# Patient Record
Sex: MALE | Race: WHITE | NOT HISPANIC OR LATINO
[De-identification: names, ages, dates, MRNs, and addresses within clinical notes are randomized per-mention and may not be internally consistent; named-entity substitution may affect disease eponyms.]

---

## 2019-03-26 PROBLEM — Z00.00 ENCOUNTER FOR PREVENTIVE HEALTH EXAMINATION: Status: ACTIVE | Noted: 2019-03-26

## 2019-03-28 ENCOUNTER — APPOINTMENT (OUTPATIENT)
Dept: NEPHROLOGY | Facility: CLINIC | Age: 19
End: 2019-03-28
Payer: COMMERCIAL

## 2019-03-28 ENCOUNTER — LABORATORY RESULT (OUTPATIENT)
Age: 19
End: 2019-03-28

## 2019-03-28 VITALS
DIASTOLIC BLOOD PRESSURE: 120 MMHG | BODY MASS INDEX: 22.18 KG/M2 | SYSTOLIC BLOOD PRESSURE: 220 MMHG | HEART RATE: 90 BPM | HEIGHT: 66 IN | WEIGHT: 138 LBS

## 2019-03-28 DIAGNOSIS — Z87.39 PERSONAL HISTORY OF OTHER DISEASES OF THE MUSCULOSKELETAL SYSTEM AND CONNECTIVE TISSUE: ICD-10-CM

## 2019-03-28 DIAGNOSIS — Z87.09 PERSONAL HISTORY OF OTHER DISEASES OF THE RESPIRATORY SYSTEM: ICD-10-CM

## 2019-03-28 DIAGNOSIS — Z02.82 ENCOUNTER FOR ADOPTION SERVICES: ICD-10-CM

## 2019-03-28 DIAGNOSIS — R31.0 GROSS HEMATURIA: ICD-10-CM

## 2019-03-28 PROCEDURE — 36415 COLL VENOUS BLD VENIPUNCTURE: CPT

## 2019-03-28 PROCEDURE — 99244 OFF/OP CNSLTJ NEW/EST MOD 40: CPT | Mod: 25

## 2019-03-28 NOTE — PHYSICAL EXAM
[General Appearance - Alert] : alert [General Appearance - In No Acute Distress] : in no acute distress [General Appearance - Well Nourished] : well nourished [Sclera] : the sclera and conjunctiva were normal [PERRL With Normal Accommodation] : pupils were equal in size, round, and reactive to light [Extraocular Movements] : extraocular movements were intact [Outer Ear] : the ears and nose were normal in appearance [Oropharynx] : the oropharynx was normal [Neck Appearance] : the appearance of the neck was normal [Neck Cervical Mass (___cm)] : no neck mass was observed [] : no respiratory distress [Auscultation Breath Sounds / Voice Sounds] : lungs were clear to auscultation bilaterally [Heart Rate And Rhythm] : heart rate was normal and rhythm regular [Heart Sounds] : normal S1 and S2 [Murmurs] : no murmurs [Heart Sounds Pericardial Friction Rub] : no pericardial rub [Arterial Pulses Carotid] : carotid pulses were normal with no bruits [Edema] : there was no peripheral edema [Bowel Sounds] : normal bowel sounds [Abdomen Soft] : soft [Abdomen Tenderness] : non-tender [No CVA Tenderness] : no ~M costovertebral angle tenderness [No Focal Deficits] : no focal deficits [Oriented To Time, Place, And Person] : oriented to person, place, and time [Affect] : the affect was normal [Mood] : the mood was normal [FreeTextEntry1] : striae on folds of upper arms and back

## 2019-03-28 NOTE — CONSULT LETTER
[Dear  ___] : Dear  [unfilled], [Consult Letter:] : I had the pleasure of evaluating your patient, [unfilled]. [( Thank you for referring [unfilled] for consultation for _____ )] : Thank you for referring [unfilled] for consultation for [unfilled] [Please see my note below.] : Please see my note below. [Consult Closing:] : Thank you very much for allowing me to participate in the care of this patient.  If you have any questions, please do not hesitate to contact me. [Sincerely,] : Sincerely, [FreeTextEntry3] : Gabrielle Wilkerson MD\par  [facility]\par Division of Nephrology and Hypertension

## 2019-03-28 NOTE — HISTORY OF PRESENT ILLNESS
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  He was seen by his PMD Dr. Morse earlier in the week.  His blood pressure was 160/120s\par He also had a bout of gross hematuria on Monday and he saw clot, which eventually resolved.  Denies any flank pain, denies any dysuria or fevers.\par Patient has a history of elevated blood pressure since 2017.  According to patient and family members does not appear to have followed up with his poorly controlled hypertension.  Blood pressure was in the 180-200s.  Reportedly had renal sonogram done as well as echocardiogram in 2017 results of which are not available to me at the present.\par Does not take any stimulants.\par He used to take Advil every couple weeks for neck pain or headaches.  \par Denies any soda or ceffeine.  Drinks 5-6 cups of water.  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

## 2019-03-28 NOTE — ASSESSMENT
[FreeTextEntry1] : 19-year-old male with history of malignant hypertension and gross hematuria one episode.\par Malignant hypertension reportedly since 2017.  Currently not on any medication.  An unclear workup.\par We will have him perform comprehensive blood work today including serum metanephrine, aldosterone, renin cortisol. TSH and free T4\par Performed 24-hour fractionated catecholamines after abstaining from chocolate for a few days.\par Perform renal Doppler.  \par Low-sodium diet.\par Start labetalol 100 mg 2 times a day with eventually escalation of dose.\par Gross hematuria: Follow-up renal function today.  Check UA/microalbumin to creatinine ratio.  We will check complements as well as ANCA and double-stranded DNA\par Avoid NSAIDs.\par All questions were answered.  \par Follow-up next week for BP check.  \par He was advised to monitor home readings

## 2019-03-28 NOTE — REASON FOR VISIT
[Consultation] : a consultation visit [Parent] : parent [FreeTextEntry1] : Malignant hypertension and hematuria with proteinuria

## 2019-04-01 LAB
ALBUMIN SERPL ELPH-MCNC: 4.9 G/DL
ALDOSTERONE SERUM: 10.6 NG/DL
ALP BLD-CCNC: 98 U/L
ALT SERPL-CCNC: 45 U/L
ANA SER IF-ACNC: NEGATIVE
ANION GAP SERPL CALC-SCNC: 20 MMOL/L
APPEARANCE: CLEAR
AST SERPL-CCNC: 24 U/L
BACTERIA: NEGATIVE
BASOPHILS # BLD AUTO: 0.04 K/UL
BASOPHILS NFR BLD AUTO: 0.6 %
BILIRUB SERPL-MCNC: 0.4 MG/DL
BILIRUBIN URINE: NEGATIVE
BLOOD URINE: NEGATIVE
BUN SERPL-MCNC: 16 MG/DL
C3 SERPL-MCNC: 159 MG/DL
C4 SERPL-MCNC: 20 MG/DL
CALCIUM SERPL-MCNC: 10 MG/DL
CHLORIDE SERPL-SCNC: 101 MMOL/L
CHOLEST SERPL-MCNC: 225 MG/DL
CHOLEST/HDLC SERPL: 5.6 RATIO
CO2 SERPL-SCNC: 22 MMOL/L
COLOR: NORMAL
CORTIS SERPL-MCNC: 10 UG/DL
CREAT SERPL-MCNC: 1.07 MG/DL
CREAT SPEC-SCNC: 138 MG/DL
EOSINOPHIL # BLD AUTO: 0.08 K/UL
EOSINOPHIL NFR BLD AUTO: 1.2 %
ESTIMATED AVERAGE GLUCOSE: 100 MG/DL
GLUCOSE QUALITATIVE U: NEGATIVE
GLUCOSE SERPL-MCNC: 82 MG/DL
HBA1C MFR BLD HPLC: 5.1 %
HCT VFR BLD CALC: 48.2 %
HDLC SERPL-MCNC: 40 MG/DL
HGB BLD-MCNC: 16.3 G/DL
HYALINE CASTS: 0 /LPF
IMM GRANULOCYTES NFR BLD AUTO: 0.1 %
KETONES URINE: NEGATIVE
LDLC SERPL CALC-MCNC: 113 MG/DL
LEUKOCYTE ESTERASE URINE: NEGATIVE
LYMPHOCYTES # BLD AUTO: 1.42 K/UL
LYMPHOCYTES NFR BLD AUTO: 20.6 %
MAGNESIUM SERPL-MCNC: 2.1 MG/DL
MAN DIFF?: NORMAL
MCHC RBC-ENTMCNC: 30 PG
MCHC RBC-ENTMCNC: 33.8 GM/DL
MCV RBC AUTO: 88.8 FL
MICROALBUMIN 24H UR DL<=1MG/L-MCNC: 43.3 MG/DL
MICROALBUMIN/CREAT 24H UR-RTO: 313 MG/G
MICROSCOPIC-UA: NORMAL
MONOCYTES # BLD AUTO: 0.55 K/UL
MONOCYTES NFR BLD AUTO: 8 %
MPO AB + PR3 PNL SER: NORMAL
NEUTROPHILS # BLD AUTO: 4.79 K/UL
NEUTROPHILS NFR BLD AUTO: 69.5 %
NITRITE URINE: NEGATIVE
PH URINE: 7
PLATELET # BLD AUTO: 265 K/UL
POTASSIUM SERPL-SCNC: 4.7 MMOL/L
PROT SERPL-MCNC: 8.5 G/DL
PROTEIN URINE: ABNORMAL
RBC # BLD: 5.43 M/UL
RBC # FLD: 13.9 %
RED BLOOD CELLS URINE: 3 /HPF
SODIUM SERPL-SCNC: 142 MMOL/L
SPECIFIC GRAVITY URINE: 1.02
SQUAMOUS EPITHELIAL CELLS: 1 /HPF
TRIGL SERPL-MCNC: 362 MG/DL
TSH SERPL-ACNC: 1.32 UIU/ML
UROBILINOGEN URINE: NORMAL
WBC # FLD AUTO: 6.89 K/UL
WHITE BLOOD CELLS URINE: 1 /HPF

## 2019-04-03 ENCOUNTER — APPOINTMENT (OUTPATIENT)
Dept: NEPHROLOGY | Facility: CLINIC | Age: 19
End: 2019-04-03
Payer: COMMERCIAL

## 2019-04-03 VITALS
HEART RATE: 83 BPM | HEIGHT: 66 IN | SYSTOLIC BLOOD PRESSURE: 174 MMHG | WEIGHT: 145.5 LBS | DIASTOLIC BLOOD PRESSURE: 102 MMHG | BODY MASS INDEX: 23.38 KG/M2 | OXYGEN SATURATION: 98 %

## 2019-04-03 VITALS — DIASTOLIC BLOOD PRESSURE: 98 MMHG | HEART RATE: 80 BPM | SYSTOLIC BLOOD PRESSURE: 168 MMHG

## 2019-04-03 LAB
DSDNA AB SER-ACNC: <12 IU/ML
METANEPHRINE, PL: <10 PG/ML
NORMETANEPHRINE, PL: 12 PG/ML
RENIN ACTIVITY, PLASMA: 5.21 NG/ML/HR

## 2019-04-03 PROCEDURE — 99214 OFFICE O/P EST MOD 30 MIN: CPT

## 2019-04-04 NOTE — HISTORY OF PRESENT ILLNESS
[FreeTextEntry1] : Tolerating labetalol 100AM and 200 PM last couple days\par BP at home have been 160-170/90s\par Overall improvement

## 2019-04-04 NOTE — PHYSICAL EXAM
[General Appearance - Alert] : alert [General Appearance - In No Acute Distress] : in no acute distress [Sclera] : the sclera and conjunctiva were normal [Outer Ear] : the ears and nose were normal in appearance [Neck Appearance] : the appearance of the neck was normal [Auscultation Breath Sounds / Voice Sounds] : lungs were clear to auscultation bilaterally [Heart Rate And Rhythm] : heart rate was normal and rhythm regular [Heart Sounds] : normal S1 and S2 [Heart Sounds Gallop] : no gallops [Murmurs] : no murmurs [Heart Sounds Pericardial Friction Rub] : no pericardial rub [Edema] : there was no peripheral edema [Bowel Sounds] : normal bowel sounds [Abdomen Soft] : soft [No CVA Tenderness] : no ~M costovertebral angle tenderness [] : no rash [No Focal Deficits] : no focal deficits [Oriented To Time, Place, And Person] : oriented to person, place, and time [Affect] : the affect was normal

## 2019-04-04 NOTE — ASSESSMENT
[FreeTextEntry1] : 18 yo male with likely essential HTN\par Much improved BPs \par He was adopted and born in [place]\par His serologies are negative and kaylah, renin, metanephrine neg\par 24 hour catecholamine pending, renal doppler to be done\par Increase labetalol 200mg BID for this week then next week increase to 300mg BID\par Followup in 2 weeks\par

## 2019-04-11 LAB
DOPAMINE UR-MCNC: 204 UG/L
DOPAMINE, UR, 24HR: 163 UG/24 HR
EPINEPH UR-MCNC: 8 UG/L
EPINEPHRINE, U, 24HR: 6 UG/24 HR
NOREPINEPH UR-MCNC: 18 UG/L
NOREPINEPHRINE,U,24H: 14 UG/24 HR

## 2019-04-16 ENCOUNTER — APPOINTMENT (OUTPATIENT)
Dept: NEPHROLOGY | Facility: CLINIC | Age: 19
End: 2019-04-16
Payer: COMMERCIAL

## 2019-04-16 VITALS
SYSTOLIC BLOOD PRESSURE: 152 MMHG | WEIGHT: 149.91 LBS | HEIGHT: 66 IN | DIASTOLIC BLOOD PRESSURE: 103 MMHG | OXYGEN SATURATION: 98 % | BODY MASS INDEX: 24.09 KG/M2 | HEART RATE: 73 BPM

## 2019-04-16 PROCEDURE — 99214 OFFICE O/P EST MOD 30 MIN: CPT

## 2019-04-17 NOTE — ASSESSMENT
[FreeTextEntry1] : 18 yo male with likely essential HTN\par Much improved BPs \par He was adopted and born in [place]\par His serologies are negative and kaylah, renin, metanephrine neg\par 24 hour catecholamine neg, renal doppler to be done\par Increase labetalol 300mg TID\par followup in 2 months

## 2019-04-17 NOTE — HISTORY OF PRESENT ILLNESS
[FreeTextEntry1] : Tolerating labetalol 300 mg BID\par BP was 130-140/80-90s\par Doing well\par Denies sensing any physical change

## 2019-04-17 NOTE — PHYSICAL EXAM
[General Appearance - Alert] : alert [General Appearance - In No Acute Distress] : in no acute distress [Outer Ear] : the ears and nose were normal in appearance [Neck Appearance] : the appearance of the neck was normal [Sclera] : the sclera and conjunctiva were normal [Auscultation Breath Sounds / Voice Sounds] : lungs were clear to auscultation bilaterally [Heart Rate And Rhythm] : heart rate was normal and rhythm regular [Heart Sounds] : normal S1 and S2 [Heart Sounds Gallop] : no gallops [Murmurs] : no murmurs [Heart Sounds Pericardial Friction Rub] : no pericardial rub [Edema] : there was no peripheral edema [Abdomen Soft] : soft [No CVA Tenderness] : no ~M costovertebral angle tenderness [Bowel Sounds] : normal bowel sounds [] : no rash [No Focal Deficits] : no focal deficits [Affect] : the affect was normal [Oriented To Time, Place, And Person] : oriented to person, place, and time

## 2019-06-18 ENCOUNTER — APPOINTMENT (OUTPATIENT)
Dept: NEPHROLOGY | Facility: CLINIC | Age: 19
End: 2019-06-18
Payer: COMMERCIAL

## 2019-06-18 VITALS
DIASTOLIC BLOOD PRESSURE: 91 MMHG | OXYGEN SATURATION: 98 % | HEIGHT: 66 IN | WEIGHT: 144.4 LBS | HEART RATE: 75 BPM | BODY MASS INDEX: 23.21 KG/M2 | SYSTOLIC BLOOD PRESSURE: 132 MMHG

## 2019-06-18 PROCEDURE — 99213 OFFICE O/P EST LOW 20 MIN: CPT

## 2019-06-18 NOTE — ASSESSMENT
[FreeTextEntry1] : 20 yo male with likely essential HTN\par Much improved BPs \par His serologies are negative and kaylah, renin, metanephrine neg\par 24 hour catecholamine neg, renal doppler neg\par Continue labetalol 300mg TID. Na restriction\par followup in 3 months and daphne:cr ratio to be done at that time

## 2019-06-18 NOTE — HISTORY OF PRESENT ILLNESS
[FreeTextEntry1] : Tolerating labetalol 300 mg TID\par BP at home has been 130/90s\par Doing well\par Finished his finals

## 2019-08-13 ENCOUNTER — APPOINTMENT (OUTPATIENT)
Dept: NEPHROLOGY | Facility: CLINIC | Age: 19
End: 2019-08-13
Payer: COMMERCIAL

## 2019-08-13 VITALS
BODY MASS INDEX: 22.5 KG/M2 | HEIGHT: 66 IN | WEIGHT: 140 LBS | OXYGEN SATURATION: 99 % | SYSTOLIC BLOOD PRESSURE: 139 MMHG | DIASTOLIC BLOOD PRESSURE: 87 MMHG | HEART RATE: 72 BPM

## 2019-08-13 VITALS — SYSTOLIC BLOOD PRESSURE: 136 MMHG | DIASTOLIC BLOOD PRESSURE: 86 MMHG | HEART RATE: 70 BPM

## 2019-08-13 PROCEDURE — 99213 OFFICE O/P EST LOW 20 MIN: CPT

## 2019-08-13 NOTE — HISTORY OF PRESENT ILLNESS
[FreeTextEntry1] : Tolerating labetalol 300 mg TID\par BP at home has been 120-130/80-90s\par Doing well\par Has been resting over the summer and due back at [employer] later this month.

## 2019-08-13 NOTE — ASSESSMENT
[FreeTextEntry1] : 20 yo male with likely essential HTN\par Much improved BPs \par His serologies are negative and kaylah, renin, metanephrine neg\par 24 hour catecholamine neg, renal doppler neg\par Continue labetalol 300mg TID. \par Na restriction\par Proteinuria: Recheck microalbumin to creatinine ratio.\par Followup in 6 month

## 2019-08-14 LAB
CREAT SPEC-SCNC: 244 MG/DL
MICROALBUMIN 24H UR DL<=1MG/L-MCNC: 11.3 MG/DL
MICROALBUMIN/CREAT 24H UR-RTO: 46 MG/G

## 2020-02-18 ENCOUNTER — APPOINTMENT (OUTPATIENT)
Dept: NEPHROLOGY | Facility: CLINIC | Age: 20
End: 2020-02-18
Payer: COMMERCIAL

## 2020-02-18 ENCOUNTER — APPOINTMENT (OUTPATIENT)
Dept: NEPHROLOGY | Facility: CLINIC | Age: 20
End: 2020-02-18

## 2020-02-18 VITALS
HEIGHT: 66 IN | WEIGHT: 150 LBS | SYSTOLIC BLOOD PRESSURE: 130 MMHG | DIASTOLIC BLOOD PRESSURE: 68 MMHG | HEART RATE: 70 BPM | BODY MASS INDEX: 24.11 KG/M2

## 2020-02-18 PROCEDURE — 99213 OFFICE O/P EST LOW 20 MIN: CPT

## 2020-02-18 NOTE — ASSESSMENT
[FreeTextEntry1] : 18 yo male with likely essential HTN\par Much improved BPs and sustained control\par His serologies are negative and kaylah, renin, metanephrine neg\par 24 hour catecholamine neg, renal doppler neg\par Continue labetalol 300mg TID. \par Na restriction\par Proteinuria: Recheck microalbumin to creatinine ratio.\par Followup in 6 month\par OK for anti-histamine just without decongestants\par OK for prn benadryl for hives\par Mother present

## 2020-02-18 NOTE — PHYSICAL EXAM
[General Appearance - Alert] : alert [General Appearance - In No Acute Distress] : in no acute distress [Sclera] : the sclera and conjunctiva were normal [Outer Ear] : the ears and nose were normal in appearance [Neck Appearance] : the appearance of the neck was normal [Auscultation Breath Sounds / Voice Sounds] : lungs were clear to auscultation bilaterally [Heart Rate And Rhythm] : heart rate was normal and rhythm regular [Heart Sounds] : normal S1 and S2 [Murmurs] : no murmurs [Heart Sounds Pericardial Friction Rub] : no pericardial rub [Edema] : there was no peripheral edema [Bowel Sounds] : normal bowel sounds [Abdomen Soft] : soft [No CVA Tenderness] : no ~M costovertebral angle tenderness [] : no rash [No Focal Deficits] : no focal deficits [Oriented To Time, Place, And Person] : oriented to person, place, and time [Affect] : the affect was normal

## 2020-02-18 NOTE — HISTORY OF PRESENT ILLNESS
[FreeTextEntry1] : Tolerating labetalol 300 mg TID\par BP at home has been 120-130/80-90s as per pt\par Doing well\par Back in school and has 2 more semesters

## 2020-02-19 LAB
APPEARANCE: CLEAR
BACTERIA: NEGATIVE
BILIRUBIN URINE: NEGATIVE
BLOOD URINE: NEGATIVE
COLOR: YELLOW
CREAT SPEC-SCNC: 348 MG/DL
GLUCOSE QUALITATIVE U: NEGATIVE
HYALINE CASTS: 0 /LPF
KETONES URINE: NORMAL
LEUKOCYTE ESTERASE URINE: NEGATIVE
MICROALBUMIN 24H UR DL<=1MG/L-MCNC: 8.5 MG/DL
MICROALBUMIN/CREAT 24H UR-RTO: 24 MG/G
MICROSCOPIC-UA: NORMAL
NITRITE URINE: NEGATIVE
PH URINE: 6
PROTEIN URINE: ABNORMAL
RED BLOOD CELLS URINE: 2 /HPF
SPECIFIC GRAVITY URINE: 1.03
SQUAMOUS EPITHELIAL CELLS: 0 /HPF
URINE COMMENTS: NORMAL
UROBILINOGEN URINE: NORMAL
WHITE BLOOD CELLS URINE: 2 /HPF

## 2020-06-27 ENCOUNTER — RX RENEWAL (OUTPATIENT)
Age: 20
End: 2020-06-27

## 2020-08-20 ENCOUNTER — APPOINTMENT (OUTPATIENT)
Dept: NEPHROLOGY | Facility: CLINIC | Age: 20
End: 2020-08-20
Payer: COMMERCIAL

## 2020-08-20 PROCEDURE — 99442: CPT

## 2021-03-30 ENCOUNTER — APPOINTMENT (OUTPATIENT)
Dept: NEPHROLOGY | Facility: CLINIC | Age: 21
End: 2021-03-30
Payer: COMMERCIAL

## 2021-03-30 VITALS — DIASTOLIC BLOOD PRESSURE: 84 MMHG | SYSTOLIC BLOOD PRESSURE: 136 MMHG

## 2021-03-30 VITALS
DIASTOLIC BLOOD PRESSURE: 82 MMHG | BODY MASS INDEX: 28.7 KG/M2 | WEIGHT: 178.57 LBS | SYSTOLIC BLOOD PRESSURE: 156 MMHG | HEART RATE: 100 BPM | HEIGHT: 66 IN | TEMPERATURE: 98 F | OXYGEN SATURATION: 99 %

## 2021-03-30 DIAGNOSIS — I10 ESSENTIAL (PRIMARY) HYPERTENSION: ICD-10-CM

## 2021-03-30 PROCEDURE — 99072 ADDL SUPL MATRL&STAF TM PHE: CPT

## 2021-03-30 PROCEDURE — 99214 OFFICE O/P EST MOD 30 MIN: CPT

## 2021-03-30 NOTE — ASSESSMENT
[FreeTextEntry1] : 20 yo male with essential HTN\par HTN: mildly elevated from goal\par His serologies are negative and kaylah, renin, metanephrine neg\par 24 hour catecholamine neg, renal doppler neg\par Continue labetalol 300mg TID. \par Na restriction\par Need wt loss\par Proteinuria: Recheck microalbumin to creatinine ratio.\par Labs today since he has not done that in over a year\par Can get COVID vaccine- letter to pt\par Followup in 6 month\par

## 2021-03-30 NOTE — HISTORY OF PRESENT ILLNESS
[FreeTextEntry1] : Tolerating labetalol 300 mg TID\par BP at home has been 120-140/80-90s as per pt\par Has gained 20lbs\par Ordering out a lot

## 2021-03-31 LAB
25(OH)D3 SERPL-MCNC: 5.6 NG/ML
ALBUMIN SERPL ELPH-MCNC: 4.6 G/DL
ALP BLD-CCNC: 97 U/L
ALT SERPL-CCNC: 38 U/L
ANION GAP SERPL CALC-SCNC: 14 MMOL/L
AST SERPL-CCNC: 19 U/L
BASOPHILS # BLD AUTO: 0.04 K/UL
BASOPHILS NFR BLD AUTO: 0.5 %
BILIRUB SERPL-MCNC: 0.3 MG/DL
BUN SERPL-MCNC: 21 MG/DL
CALCIUM SERPL-MCNC: 9.9 MG/DL
CHLORIDE SERPL-SCNC: 103 MMOL/L
CHOLEST SERPL-MCNC: 255 MG/DL
CO2 SERPL-SCNC: 22 MMOL/L
CREAT SERPL-MCNC: 1.17 MG/DL
EOSINOPHIL # BLD AUTO: 0.24 K/UL
EOSINOPHIL NFR BLD AUTO: 3 %
ESTIMATED AVERAGE GLUCOSE: 111 MG/DL
GLUCOSE SERPL-MCNC: 117 MG/DL
HBA1C MFR BLD HPLC: 5.5 %
HCT VFR BLD CALC: 44.9 %
HDLC SERPL-MCNC: 28 MG/DL
HGB BLD-MCNC: 16 G/DL
IMM GRANULOCYTES NFR BLD AUTO: 0.3 %
LDLC SERPL CALC-MCNC: NORMAL MG/DL
LYMPHOCYTES # BLD AUTO: 2.23 K/UL
LYMPHOCYTES NFR BLD AUTO: 28.2 %
MAGNESIUM SERPL-MCNC: 1.9 MG/DL
MAN DIFF?: NORMAL
MCHC RBC-ENTMCNC: 31.2 PG
MCHC RBC-ENTMCNC: 35.6 GM/DL
MCV RBC AUTO: 87.5 FL
MONOCYTES # BLD AUTO: 0.71 K/UL
MONOCYTES NFR BLD AUTO: 9 %
NEUTROPHILS # BLD AUTO: 4.67 K/UL
NEUTROPHILS NFR BLD AUTO: 59 %
NONHDLC SERPL-MCNC: 227 MG/DL
PHOSPHATE SERPL-MCNC: 3.3 MG/DL
PLATELET # BLD AUTO: 277 K/UL
POTASSIUM SERPL-SCNC: 4 MMOL/L
PROT SERPL-MCNC: 7.7 G/DL
RBC # BLD: 5.13 M/UL
RBC # FLD: 13 %
SODIUM SERPL-SCNC: 139 MMOL/L
TRIGL SERPL-MCNC: 839 MG/DL
TSH SERPL-ACNC: 1.62 UIU/ML
WBC # FLD AUTO: 7.91 K/UL

## 2021-03-31 RX ORDER — EPINEPHRINE 0.3 MG/.3ML
0.3 INJECTION INTRAMUSCULAR
Qty: 2 | Refills: 0 | Status: ACTIVE | COMMUNITY
Start: 2021-02-12

## 2021-06-22 ENCOUNTER — RX RENEWAL (OUTPATIENT)
Age: 21
End: 2021-06-22

## 2021-08-03 ENCOUNTER — APPOINTMENT (OUTPATIENT)
Dept: NEPHROLOGY | Facility: CLINIC | Age: 21
End: 2021-08-03
Payer: COMMERCIAL

## 2021-08-03 VITALS
TEMPERATURE: 97.7 F | HEART RATE: 94 BPM | DIASTOLIC BLOOD PRESSURE: 86 MMHG | OXYGEN SATURATION: 97 % | WEIGHT: 174.16 LBS | SYSTOLIC BLOOD PRESSURE: 135 MMHG

## 2021-08-03 VITALS — DIASTOLIC BLOOD PRESSURE: 72 MMHG | SYSTOLIC BLOOD PRESSURE: 128 MMHG | HEART RATE: 80 BPM

## 2021-08-03 PROCEDURE — 99213 OFFICE O/P EST LOW 20 MIN: CPT

## 2021-08-03 NOTE — HISTORY OF PRESENT ILLNESS
[FreeTextEntry1] : Tolerating labetalol 300 mg TID\par BP at home has been 120-130/80-90s as per pt\par Completed [employer] [facility] [employer] [facility]\par Going to go for Post Grad in history

## 2021-08-03 NOTE — ASSESSMENT
[FreeTextEntry1] : 22 yo male with essential HTN\par HTN: controlled\par His serologies are negative and kaylah, renin, metanephrine neg\par 24 hour catecholamine neg, renal doppler neg\par Continue labetalol 300mg TID. \par Na restriction\par Need wt loss\par Proteinuria: normal\par Followup with PMD\par Followup in 6 month\par

## 2021-08-03 NOTE — PHYSICAL EXAM
[General Appearance - Alert] : alert [General Appearance - In No Acute Distress] : in no acute distress [Sclera] : the sclera and conjunctiva were normal [Outer Ear] : the ears and nose were normal in appearance [Neck Appearance] : the appearance of the neck was normal [Auscultation Breath Sounds / Voice Sounds] : lungs were clear to auscultation bilaterally [Heart Rate And Rhythm] : heart rate was normal and rhythm regular [Murmurs] : no murmurs [Heart Sounds] : normal S1 and S2 [Heart Sounds Pericardial Friction Rub] : no pericardial rub [Edema] : there was no peripheral edema [Bowel Sounds] : normal bowel sounds [Abdomen Soft] : soft [No CVA Tenderness] : no ~M costovertebral angle tenderness [] : no rash [No Focal Deficits] : no focal deficits [Oriented To Time, Place, And Person] : oriented to person, place, and time [Affect] : the affect was normal

## 2022-02-22 ENCOUNTER — NON-APPOINTMENT (OUTPATIENT)
Age: 22
End: 2022-02-22

## 2022-02-22 ENCOUNTER — APPOINTMENT (OUTPATIENT)
Dept: NEPHROLOGY | Facility: CLINIC | Age: 22
End: 2022-02-22
Payer: COMMERCIAL

## 2022-02-22 VITALS — HEART RATE: 78 BPM | WEIGHT: 171.52 LBS | DIASTOLIC BLOOD PRESSURE: 80 MMHG | SYSTOLIC BLOOD PRESSURE: 130 MMHG

## 2022-02-22 PROCEDURE — 99213 OFFICE O/P EST LOW 20 MIN: CPT

## 2022-02-22 NOTE — ASSESSMENT
[FreeTextEntry1] : 20 yo male with essential HTN\par HTN: controlled\par His serologies are negative and kaylah, renin, metanephrine neg\par 24 hour catecholamine neg, renal doppler neg\par Continue labetalol 300mg TID. Med renewed\par \par Na restriction\par Need wt loss\par Labs today\par Proteinuria: unable to give urine today\par Followup with PMD\par Followup in 1 year\par

## 2022-02-22 NOTE — HISTORY OF PRESENT ILLNESS
[FreeTextEntry1] : Tolerating labetalol 300 mg TID\par BP at home has been 120-130/80-90s as per pt\par Completed [employer] [facility] [place]\par Likely Post Grad in history\par \par

## 2022-02-24 LAB
25(OH)D3 SERPL-MCNC: 7.1 NG/ML
ALBUMIN SERPL ELPH-MCNC: 4.5 G/DL
ALP BLD-CCNC: 71 U/L
ALT SERPL-CCNC: 49 U/L
ANION GAP SERPL CALC-SCNC: 12 MMOL/L
AST SERPL-CCNC: 19 U/L
BASOPHILS # BLD AUTO: 0.05 K/UL
BASOPHILS NFR BLD AUTO: 0.9 %
BILIRUB SERPL-MCNC: 0.5 MG/DL
BUN SERPL-MCNC: 15 MG/DL
CALCIUM SERPL-MCNC: 9.4 MG/DL
CHLORIDE SERPL-SCNC: 106 MMOL/L
CHOLEST SERPL-MCNC: 184 MG/DL
CO2 SERPL-SCNC: 21 MMOL/L
CREAT SERPL-MCNC: 1.13 MG/DL
EOSINOPHIL # BLD AUTO: 0.16 K/UL
EOSINOPHIL NFR BLD AUTO: 2.9 %
ESTIMATED AVERAGE GLUCOSE: 105 MG/DL
GLUCOSE SERPL-MCNC: 101 MG/DL
HBA1C MFR BLD HPLC: 5.3 %
HCT VFR BLD CALC: 44.8 %
HDLC SERPL-MCNC: 34 MG/DL
HGB BLD-MCNC: 15.1 G/DL
IMM GRANULOCYTES NFR BLD AUTO: 0.4 %
LDLC SERPL CALC-MCNC: 116 MG/DL
LYMPHOCYTES # BLD AUTO: 1.54 K/UL
LYMPHOCYTES NFR BLD AUTO: 27.5 %
MAN DIFF?: NORMAL
MCHC RBC-ENTMCNC: 30.3 PG
MCHC RBC-ENTMCNC: 33.7 GM/DL
MCV RBC AUTO: 90 FL
MONOCYTES # BLD AUTO: 0.48 K/UL
MONOCYTES NFR BLD AUTO: 8.6 %
NEUTROPHILS # BLD AUTO: 3.34 K/UL
NEUTROPHILS NFR BLD AUTO: 59.7 %
NONHDLC SERPL-MCNC: 150 MG/DL
PLATELET # BLD AUTO: 251 K/UL
POTASSIUM SERPL-SCNC: 3.8 MMOL/L
PROT SERPL-MCNC: 7.3 G/DL
RBC # BLD: 4.98 M/UL
RBC # FLD: 13.4 %
SODIUM SERPL-SCNC: 140 MMOL/L
TRIGL SERPL-MCNC: 170 MG/DL
WBC # FLD AUTO: 5.59 K/UL

## 2022-02-24 RX ORDER — ERGOCALCIFEROL 1.25 MG/1
1.25 MG CAPSULE, LIQUID FILLED ORAL
Qty: 12 | Refills: 0 | Status: ACTIVE | COMMUNITY
Start: 2021-03-31 | End: 1900-01-01

## 2023-02-21 ENCOUNTER — APPOINTMENT (OUTPATIENT)
Dept: NEPHROLOGY | Facility: CLINIC | Age: 23
End: 2023-02-21
Payer: COMMERCIAL

## 2023-02-21 VITALS
DIASTOLIC BLOOD PRESSURE: 80 MMHG | HEART RATE: 78 BPM | HEIGHT: 66 IN | WEIGHT: 149.91 LBS | SYSTOLIC BLOOD PRESSURE: 110 MMHG | BODY MASS INDEX: 24.09 KG/M2

## 2023-02-21 DIAGNOSIS — R80.9 PROTEINURIA, UNSPECIFIED: ICD-10-CM

## 2023-02-21 PROCEDURE — 99214 OFFICE O/P EST MOD 30 MIN: CPT

## 2023-02-21 NOTE — HISTORY OF PRESENT ILLNESS
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

## 2023-02-21 NOTE — ASSESSMENT
[FreeTextEntry1] : 21 yo male with essential HTN\par HTN: controlled\par His serologies are negative and kaylah, renin, metanephrine neg\par 24 hour catecholamine neg, renal doppler neg\par Continue labetalol 300mg TID. Med renewed\par \par Na restriction\par Wt loss applauded\par Proteinuria: unable to give urine today- trend with PMD with increased water intake with labs \par Followup with PMD\par Followup in 1 year\par

## 2024-02-20 ENCOUNTER — APPOINTMENT (OUTPATIENT)
Dept: NEPHROLOGY | Facility: CLINIC | Age: 24
End: 2024-02-20
Payer: MEDICAID

## 2024-02-20 VITALS — SYSTOLIC BLOOD PRESSURE: 138 MMHG | DIASTOLIC BLOOD PRESSURE: 80 MMHG | HEART RATE: 80 BPM

## 2024-02-20 VITALS
HEIGHT: 66 IN | TEMPERATURE: 206.96 F | HEART RATE: 90 BPM | BODY MASS INDEX: 26.57 KG/M2 | DIASTOLIC BLOOD PRESSURE: 88 MMHG | SYSTOLIC BLOOD PRESSURE: 147 MMHG | OXYGEN SATURATION: 96 % | WEIGHT: 165.34 LBS

## 2024-02-20 DIAGNOSIS — E55.9 VITAMIN D DEFICIENCY, UNSPECIFIED: ICD-10-CM

## 2024-02-20 DIAGNOSIS — I10 ESSENTIAL (PRIMARY) HYPERTENSION: ICD-10-CM

## 2024-02-20 PROCEDURE — G2211 COMPLEX E/M VISIT ADD ON: CPT | Mod: NC,1L

## 2024-02-20 PROCEDURE — 99213 OFFICE O/P EST LOW 20 MIN: CPT

## 2024-02-20 RX ORDER — LABETALOL HYDROCHLORIDE 300 MG/1
300 TABLET, FILM COATED ORAL
Qty: 270 | Refills: 3 | Status: ACTIVE | COMMUNITY
Start: 2019-03-28 | End: 1900-01-01

## 2024-02-20 RX ORDER — CHROMIUM 200 MCG
1000 TABLET ORAL
Refills: 0 | Status: ACTIVE | COMMUNITY

## 2024-02-20 NOTE — ASSESSMENT
[FreeTextEntry1] : 22 yo male with essential HTN HTN: mildly higher than baseline. Just took his first am dose  His serologies are negative and kaylah, renin, metanephrine neg 24 hour catecholamine neg, renal doppler neg Continue labetalol 300mg TID. Med renewed  Na restriction Need wt loss exercise lifestyle modification Labs today Proteinuria: ACR Followup with PMD Followup in 1 year

## 2024-02-20 NOTE — PHYSICAL EXAM
[General Appearance - Alert] : alert [General Appearance - In No Acute Distress] : in no acute distress [Sclera] : the sclera and conjunctiva were normal [Outer Ear] : the ears and nose were normal in appearance [Neck Appearance] : the appearance of the neck was normal [Auscultation Breath Sounds / Voice Sounds] : lungs were clear to auscultation bilaterally [Heart Rate And Rhythm] : heart rate was normal and rhythm regular [Heart Sounds] : normal S1 and S2 [Murmurs] : no murmurs [Heart Sounds Pericardial Friction Rub] : no pericardial rub [Edema] : there was no peripheral edema [Bowel Sounds] : normal bowel sounds [Abdomen Soft] : soft [] : no rash [No Focal Deficits] : no focal deficits [Oriented To Time, Place, And Person] : oriented to person, place, and time [Affect] : the affect was normal

## 2024-02-20 NOTE — HISTORY OF PRESENT ILLNESS
[FreeTextEntry1] : Tolerating labetalol 300 mg TID BP at home has been 120-130 Completed [facility] [employer] on line from home Has not seen PMD over 1 year

## 2024-02-22 ENCOUNTER — NON-APPOINTMENT (OUTPATIENT)
Age: 24
End: 2024-02-22

## 2024-02-22 LAB
25(OH)D3 SERPL-MCNC: 18.2 NG/ML
ALBUMIN SERPL ELPH-MCNC: 4.6 G/DL
ALP BLD-CCNC: 61 U/L
ALT SERPL-CCNC: 45 U/L
ANION GAP SERPL CALC-SCNC: 16 MMOL/L
APPEARANCE: CLEAR
AST SERPL-CCNC: 20 U/L
BACTERIA: NEGATIVE /HPF
BASOPHILS # BLD AUTO: 0.03 K/UL
BASOPHILS NFR BLD AUTO: 0.5 %
BILIRUB SERPL-MCNC: 0.6 MG/DL
BILIRUBIN URINE: NEGATIVE
BLOOD URINE: NEGATIVE
BUN SERPL-MCNC: 21 MG/DL
CALCIUM SERPL-MCNC: 9.4 MG/DL
CAST: 1 /LPF
CHLORIDE SERPL-SCNC: 99 MMOL/L
CHOLEST SERPL-MCNC: 240 MG/DL
CO2 SERPL-SCNC: 21 MMOL/L
COLOR: YELLOW
CREAT SERPL-MCNC: 1.23 MG/DL
CREAT SPEC-SCNC: 159 MG/DL
EGFR: 84 ML/MIN/1.73M2
EOSINOPHIL # BLD AUTO: 0.19 K/UL
EOSINOPHIL NFR BLD AUTO: 3.4 %
EPITHELIAL CELLS: 0 /HPF
ESTIMATED AVERAGE GLUCOSE: 105 MG/DL
GLUCOSE QUALITATIVE U: NEGATIVE MG/DL
GLUCOSE SERPL-MCNC: 82 MG/DL
HBA1C MFR BLD HPLC: 5.3 %
HCT VFR BLD CALC: 43.3 %
HDLC SERPL-MCNC: 42 MG/DL
HGB BLD-MCNC: 15.5 G/DL
IMM GRANULOCYTES NFR BLD AUTO: 0.2 %
KETONES URINE: NEGATIVE MG/DL
LDLC SERPL CALC-MCNC: 164 MG/DL
LEUKOCYTE ESTERASE URINE: NEGATIVE
LYMPHOCYTES # BLD AUTO: 1.63 K/UL
LYMPHOCYTES NFR BLD AUTO: 28.8 %
MAN DIFF?: NORMAL
MCHC RBC-ENTMCNC: 30.3 PG
MCHC RBC-ENTMCNC: 35.8 GM/DL
MCV RBC AUTO: 84.7 FL
MICROALBUMIN 24H UR DL<=1MG/L-MCNC: 7.6 MG/DL
MICROALBUMIN/CREAT 24H UR-RTO: 48 MG/G
MICROSCOPIC-UA: NORMAL
MONOCYTES # BLD AUTO: 0.44 K/UL
MONOCYTES NFR BLD AUTO: 7.8 %
NEUTROPHILS # BLD AUTO: 3.36 K/UL
NEUTROPHILS NFR BLD AUTO: 59.3 %
NITRITE URINE: NEGATIVE
NONHDLC SERPL-MCNC: 198 MG/DL
PH URINE: 6
PLATELET # BLD AUTO: 199 K/UL
POTASSIUM SERPL-SCNC: 3.7 MMOL/L
PROT SERPL-MCNC: 7.9 G/DL
PROTEIN URINE: NORMAL MG/DL
RBC # BLD: 5.11 M/UL
RBC # FLD: 13.1 %
RED BLOOD CELLS URINE: 1 /HPF
SODIUM SERPL-SCNC: 136 MMOL/L
SPECIFIC GRAVITY URINE: 1.02
TRIGL SERPL-MCNC: 186 MG/DL
TSH SERPL-ACNC: 1.36 UIU/ML
UROBILINOGEN URINE: 0.2 MG/DL
WBC # FLD AUTO: 5.66 K/UL
WHITE BLOOD CELLS URINE: 0 /HPF

## 2025-09-08 ENCOUNTER — APPOINTMENT (OUTPATIENT)
Dept: NEPHROLOGY | Facility: CLINIC | Age: 25
End: 2025-09-08